# Patient Record
Sex: FEMALE | Race: BLACK OR AFRICAN AMERICAN | NOT HISPANIC OR LATINO
[De-identification: names, ages, dates, MRNs, and addresses within clinical notes are randomized per-mention and may not be internally consistent; named-entity substitution may affect disease eponyms.]

---

## 2022-12-20 PROBLEM — Z00.00 ENCOUNTER FOR PREVENTIVE HEALTH EXAMINATION: Status: ACTIVE | Noted: 2022-12-20

## 2022-12-23 ENCOUNTER — LABORATORY RESULT (OUTPATIENT)
Age: 32
End: 2022-12-23

## 2022-12-23 ENCOUNTER — NON-APPOINTMENT (OUTPATIENT)
Age: 32
End: 2022-12-23

## 2022-12-23 ENCOUNTER — APPOINTMENT (OUTPATIENT)
Dept: PULMONOLOGY | Facility: CLINIC | Age: 32
End: 2022-12-23

## 2022-12-23 VITALS
WEIGHT: 112 LBS | SYSTOLIC BLOOD PRESSURE: 122 MMHG | DIASTOLIC BLOOD PRESSURE: 78 MMHG | TEMPERATURE: 96.7 F | HEIGHT: 57 IN | HEART RATE: 77 BPM | OXYGEN SATURATION: 100 % | BODY MASS INDEX: 24.16 KG/M2

## 2022-12-23 DIAGNOSIS — J45.909 UNSPECIFIED ASTHMA, UNCOMPLICATED: ICD-10-CM

## 2022-12-23 LAB — EPWORTH SCORE: 4

## 2022-12-23 PROCEDURE — 99204 OFFICE O/P NEW MOD 45 MIN: CPT

## 2022-12-23 NOTE — PHYSICAL EXAM
[General Appearance - Well Developed] : well developed [General Appearance - Well Nourished] : well nourished [Normal Conjunctiva] : the conjunctiva exhibited no abnormalities [II] : II [Neck Appearance] : the appearance of the neck was normal [Apical Impulse] : the apical impulse was normal [Heart Sounds] : normal S1 and S2 [Exaggerated Use Of Accessory Muscles For Inspiration] : no accessory muscle use [Auscultation Breath Sounds / Voice Sounds] : lungs were clear to auscultation bilaterally [No Focal Deficits] : no focal deficits [Oriented To Time, Place, And Person] : oriented to person, place, and time [Impaired Insight] : insight and judgment were intact

## 2022-12-23 NOTE — HISTORY OF PRESENT ILLNESS
[FreeTextEntry1] : 32 year old female with Hx of asthma and sleep complaints came in for initial evaluation.\par She was referred by her psychiatrist, Dr Schneider. \par \par Has dyspnea wheezing chest tightness 2-3 nights a week. ACT 16.\par ESS 4\par She is restless sleeper for past few years. She snores at night.\par Wakes up frequently during the night.\par She does not remember most of these episodes\par She started Gabapentin in the past few months with mild improvement, especially with the leg movement.\par The movements are not repetitive.\par Has low energy in the daytime but not sleepy.\par has PTSD for many years. She is on Lithium, Bupoprion\par No cataplexy\par \par Asthma is not well controlled for many months.\par Uses Albuterol. She stopped using Flovent 1 year ago.\par No hospitalizations for asthma. Took Prednisone once.\par Has spring allergies\par \par SHX: non smoker\par FHX: asthma\par \par \par

## 2022-12-23 NOTE — REVIEW OF SYSTEMS
[Fatigue] : fatigue [Snoring] : snoring [Depression] : depression [Anxious] : anxious [Sleep Disturbances due to LE symptoms] : ~T sleep disturbances due to lower extremity symptoms [Unusual Sleep Behavior] : unusual sleep behavior [Negative] : Musculoskeletal [Difficulty Initiating Sleep] : no difficulty falling asleep [Difficulty Maintaining Sleep] : no difficulty maintaining sleep [Acute Insomnia] : no acute insomnia [Chronic Insomnia] : no chronic  insomnia [Hypersomnolence] : not sleeping much more than usual [Cataplexy] :  no cataplexy [Hypnogogic Hallucinations] : no hypnogogic hallucinations [Hypnopompic Hallucinations] : no hypnopompic hallucinations [Sleep Paralysis] : no sleep paralysis [FreeTextEntry8] : see HPI

## 2022-12-24 LAB
BASOPHILS # BLD AUTO: 0.15 K/UL
BASOPHILS NFR BLD AUTO: 2.7 %
EOSINOPHIL # BLD AUTO: 0.05 K/UL
EOSINOPHIL NFR BLD AUTO: 0.9 %
HCT VFR BLD CALC: 42.1 %
HGB BLD-MCNC: 12.8 G/DL
LYMPHOCYTES # BLD AUTO: 0.64 K/UL
LYMPHOCYTES NFR BLD AUTO: 11.6 %
MAN DIFF?: NORMAL
MCHC RBC-ENTMCNC: 26.3 PG
MCHC RBC-ENTMCNC: 30.4 GM/DL
MCV RBC AUTO: 86.4 FL
MONOCYTES # BLD AUTO: 0.54 K/UL
MONOCYTES NFR BLD AUTO: 9.8 %
NEUTROPHILS # BLD AUTO: 4.11 K/UL
NEUTROPHILS NFR BLD AUTO: 75 %
PLATELET # BLD AUTO: 316 K/UL
RBC # BLD: 4.87 M/UL
RBC # FLD: 12.7 %
WBC # FLD AUTO: 5.48 K/UL

## 2022-12-27 RX ORDER — EPINEPHRINE 0.3 MG/.3ML
0.3 INJECTION INTRAMUSCULAR
Qty: 1 | Refills: 2 | Status: ACTIVE | COMMUNITY
Start: 2022-12-23

## 2022-12-30 ENCOUNTER — APPOINTMENT (OUTPATIENT)
Dept: NEUROLOGY | Facility: CLINIC | Age: 32
End: 2022-12-30
Payer: COMMERCIAL

## 2022-12-30 VITALS
SYSTOLIC BLOOD PRESSURE: 116 MMHG | OXYGEN SATURATION: 98 % | HEART RATE: 72 BPM | WEIGHT: 112 LBS | HEIGHT: 57 IN | BODY MASS INDEX: 24.16 KG/M2 | DIASTOLIC BLOOD PRESSURE: 78 MMHG | TEMPERATURE: 97.8 F

## 2022-12-30 DIAGNOSIS — F43.10 POST-TRAUMATIC STRESS DISORDER, UNSPECIFIED: ICD-10-CM

## 2022-12-30 DIAGNOSIS — R61 GENERALIZED HYPERHIDROSIS: ICD-10-CM

## 2022-12-30 DIAGNOSIS — M62.838 OTHER MUSCLE SPASM: ICD-10-CM

## 2022-12-30 LAB
A ALTERNATA IGE QN: 7.89 KUA/L
A FUMIGATUS IGE QN: 0.16 KUA/L
BERMUDA GRASS IGE QN: 0.49 KUA/L
BOXELDER IGE QN: 2.97 KUA/L
C HERBARUM IGE QN: 0.22 KUA/L
CALIF WALNUT IGE QN: 5.09 KUA/L
CAT DANDER IGE QN: 27.7 KUA/L
CMN PIGWEED IGE QN: 0.36 KUA/L
COMMON RAGWEED IGE QN: 9.42 KUA/L
COTTONWOOD IGE QN: 1.6 KUA/L
D FARINAE IGE QN: 55.4 KUA/L
D PTERONYSS IGE QN: 44.2 KUA/L
DEPRECATED A ALTERNATA IGE RAST QL: 3
DEPRECATED A FUMIGATUS IGE RAST QL: NORMAL
DEPRECATED BERMUDA GRASS IGE RAST QL: 1
DEPRECATED BOXELDER IGE RAST QL: 2
DEPRECATED C HERBARUM IGE RAST QL: NORMAL
DEPRECATED CAT DANDER IGE RAST QL: 4
DEPRECATED COMMON PIGWEED IGE RAST QL: 1
DEPRECATED COMMON RAGWEED IGE RAST QL: 3
DEPRECATED COTTONWOOD IGE RAST QL: 2
DEPRECATED D FARINAE IGE RAST QL: 5
DEPRECATED D PTERONYSS IGE RAST QL: 4
DEPRECATED DOG DANDER IGE RAST QL: 2
DEPRECATED GOOSEFOOT IGE RAST QL: 2
DEPRECATED LONDON PLANE IGE RAST QL: 2
DEPRECATED MOUSE URINE PROT IGE RAST QL: 0
DEPRECATED MUGWORT IGE RAST QL: 2
DEPRECATED P NOTATUM IGE RAST QL: 0
DEPRECATED RED CEDAR IGE RAST QL: 4
DEPRECATED ROACH IGE RAST QL: 0
DEPRECATED SHEEP SORREL IGE RAST QL: NORMAL
DEPRECATED SILVER BIRCH IGE RAST QL: 3
DEPRECATED TIMOTHY IGE RAST QL: 3
DEPRECATED WHITE ASH IGE RAST QL: 2
DEPRECATED WHITE OAK IGE RAST QL: 4
DOG DANDER IGE QN: 2.85 KUA/L
GOOSEFOOT IGE QN: 1.2 KUA/L
LONDON PLANE IGE QN: 0.91 KUA/L
MOUSE URINE PROT IGE QN: <0.1 KUA/L
MUGWORT IGE QN: 1.98 KUA/L
MULBERRY (T70) CLASS: NORMAL
MULBERRY (T70) CONC: 0.15 KUA/L
P NOTATUM IGE QN: <0.1 KUA/L
RED CEDAR IGE QN: 20.6 KUA/L
ROACH IGE QN: <0.1 KUA/L
SHEEP SORREL IGE QN: 0.16 KUA/L
SILVER BIRCH IGE QN: 13 KUA/L
TIMOTHY IGE QN: 6.7 KUA/L
TOTAL IGE SMQN RAST: 374 KU/L
TREE ALLERG MIX1 IGE QL: 3
WHITE ASH IGE QN: 2.42 KUA/L
WHITE ELM IGE QN: 2
WHITE ELM IGE QN: 2.86 KUA/L
WHITE OAK IGE QN: 21.5 KUA/L

## 2022-12-30 PROCEDURE — 99204 OFFICE O/P NEW MOD 45 MIN: CPT

## 2022-12-30 RX ORDER — GABAPENTIN 400 MG/1
400 CAPSULE ORAL
Qty: 30 | Refills: 0 | Status: ACTIVE | COMMUNITY
Start: 2022-12-30

## 2022-12-30 RX ORDER — ALBUTEROL SULFATE 90 UG/1
108 (90 BASE) INHALANT RESPIRATORY (INHALATION)
Qty: 1 | Refills: 0 | Status: ACTIVE | COMMUNITY
Start: 2022-12-30

## 2022-12-30 RX ORDER — BUPROPION HYDROCHLORIDE 150 MG/1
150 TABLET, EXTENDED RELEASE ORAL DAILY
Qty: 30 | Refills: 0 | Status: ACTIVE | COMMUNITY
Start: 2022-12-30

## 2022-12-30 RX ORDER — LITHIUM CARBONATE 600 MG/1
600 CAPSULE ORAL
Qty: 30 | Refills: 0 | Status: ACTIVE | COMMUNITY
Start: 2022-12-30

## 2022-12-30 NOTE — PHYSICAL EXAM
[General Appearance - Alert] : alert [General Appearance - In No Acute Distress] : in no acute distress [Oriented To Time, Place, And Person] : oriented to person, place, and time [Impaired Insight] : insight and judgment were intact [Affect] : the affect was normal [Person] : oriented to person [Place] : oriented to place [Time] : oriented to time [Remote Intact] : remote memory intact [Registration Intact] : recent registration memory intact [Span Intact] : the attention span was normal [Concentration Intact] : normal concentrating ability [Visual Intact] : visual attention was ~T not ~L decreased [Naming Objects] : no difficulty naming common objects [Repeating Phrases] : no difficulty repeating a phrase [Writing A Sentence] : no difficulty writing a sentence [Fluency] : fluency intact [Comprehension] : comprehension intact [Reading] : reading intact [Current Events] : adequate knowledge of current events [Past History] : adequate knowledge of personal past history [Cranial Nerves Optic (II)] : visual acuity intact bilaterally,  visual fields full to confrontation, pupils equal round and reactive to light [Cranial Nerves Oculomotor (III)] : extraocular motion intact [Cranial Nerves Trigeminal (V)] : facial sensation intact symmetrically [Cranial Nerves Facial (VII)] : face symmetrical [Cranial Nerves Vestibulocochlear (VIII)] : hearing was intact bilaterally [Cranial Nerves Glossopharyngeal (IX)] : tongue and palate midline [Cranial Nerves Accessory (XI - Cranial And Spinal)] : head turning and shoulder shrug symmetric [Cranial Nerves Hypoglossal (XII)] : there was no tongue deviation with protrusion [Motor Tone] : muscle tone was normal in all four extremities [Motor Strength] : muscle strength was normal in all four extremities [No Muscle Atrophy] : normal bulk in all four extremities [Motor Handedness Right-Handed] : the patient is right hand dominant [Sensation Tactile Decrease] : light touch was intact [Balance] : balance was intact [2+] : Ankle jerk left 2+ [Sclera] : the sclera and conjunctiva were normal [PERRL With Normal Accommodation] : pupils were equal in size, round, reactive to light, with normal accommodation [Extraocular Movements] : extraocular movements were intact [Outer Ear] : the ears and nose were normal in appearance [Oropharynx] : the oropharynx was normal [Neck Appearance] : the appearance of the neck was normal [Neck Cervical Mass (___cm)] : no neck mass was observed [] : no respiratory distress [Edema] : there was no peripheral edema [Abnormal Walk] : normal gait [Short Term Intact] : short term memory impaired [Past-pointing] : there was no past-pointing [Tremor] : no tremor present [Plantar Reflex Right Only] : normal on the right [Plantar Reflex Left Only] : normal on the left [FreeTextEntry4] : Fails to remember 3 words after 5 minutes - recalls apple, nitza - does not recall baseball

## 2022-12-30 NOTE — DISCUSSION/SUMMARY
[FreeTextEntry1] : Patient is a 31yo female with history of PTSD, asthma, and sleep disorder who presents 11 days post injury for post-concussive symptosm.  She developed increase in nausea and vomiting yesterday which seems to have improved today compared to yesterday.  Overall patient's symptoms continuing to improve with exception of yesterday.  Continue symptomatic treatment and supportive care.  She has allergies to NSAIDs and acetaminophen which limits tension type headache treatment.  Given muscle tension would initiate flexeril as needed for spasm and posterior neck tension.  Orthostatics negative for dysautonomia.  \par \par -Cyclobenzaprine 5mg TID PRN\par -Hydration for management of headaches, can trial caffeine and monitor for efficacy\par -Keep symptom log and activity log, progress activity as tolerated\par -Encouraged brain rest as able\par -Work note for 2 weeks - will reassess symptoms at that time\par -Referral to endocrine and OBGYN given episodes of overheating and syncope around menstrual cycle, does not sound cardiac or epileptogenic in origin given symptomatology.\par -If symptoms persist without improvement will consider MRI brain at follow up visit\par -Orthostatic vital signs are negative at this time - will consider tilt-table in future pending evaluation with endocrine\par -Obtain sleep study as previously recommended, RLS on gabapentin, no anemia\par -Follow up in 6 weeks to assess for symptom progression

## 2022-12-30 NOTE — DATA REVIEWED
[de-identified] : CT head: No acute abnormality.   \par CT Cervical spine:  There is no evidence of acute fracture, dislocation, or paravertebral hematoma.  \par There is mild straightening of the usual cervical lordosis and a mild convex right lower cervical \par spine curvature.
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
## 2023-01-05 ENCOUNTER — TRANSCRIPTION ENCOUNTER (OUTPATIENT)
Age: 33
End: 2023-01-05

## 2023-01-10 ENCOUNTER — TRANSCRIPTION ENCOUNTER (OUTPATIENT)
Age: 33
End: 2023-01-10

## 2023-02-09 ENCOUNTER — TRANSCRIPTION ENCOUNTER (OUTPATIENT)
Age: 33
End: 2023-02-09

## 2023-02-17 ENCOUNTER — APPOINTMENT (OUTPATIENT)
Dept: NEUROLOGY | Facility: CLINIC | Age: 33
End: 2023-02-17
Payer: COMMERCIAL

## 2023-02-17 VITALS
DIASTOLIC BLOOD PRESSURE: 70 MMHG | TEMPERATURE: 97.8 F | BODY MASS INDEX: 23.73 KG/M2 | SYSTOLIC BLOOD PRESSURE: 118 MMHG | WEIGHT: 110 LBS | OXYGEN SATURATION: 99 % | HEART RATE: 74 BPM | HEIGHT: 57 IN

## 2023-02-17 PROCEDURE — 99214 OFFICE O/P EST MOD 30 MIN: CPT

## 2023-02-17 NOTE — ASSESSMENT
[FreeTextEntry1] : Patient is a 33yo female with history of PTSD, asthma, and sleep disorder who presents post injury for post-concussive symptoms.  Overall condition continues to improve.  She has allergies to NSAIDs and acetaminophen which limits tension type headache treatment.  Given muscle tension would initiate flexeril as needed for spasm and posterior neck tension.  Orthostatics negative for dysautonomia but would recommend further tilt table testing.  Does not appear to have symptoms consistent suggesting seizure.    \par \par -Cyclobenzaprine 5mg TID PRN refilled\par -PT referral provided for [facility] for Concussion therapy\par -Ordered tilt table testing, will also refer to cardiology to assess for study\par -Hydration for management of headaches, can trial caffeine and monitor for efficacy\par -Use melatonin at nighttime for sleep, start 1mg before bed may titrate as needed\par -Referral to endocrine and OBGYN given episodes of overheating and syncope around menstrual cycle, does not sound cardiac or epileptogenic in origin given symptomatology (called and scheduled appointment for 5/9/23 and on cancellation list)\par -Follow up in 2 months to assess for symptom evolution, and consider if patient appropriate for tilt table eval/card follow up
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
## 2023-02-17 NOTE — DATA REVIEWED
[de-identified] : CT head: No acute abnormality.   \par CT Cervical spine:  There is no evidence of acute fracture, dislocation, or paravertebral hematoma.  \par There is mild straightening of the usual cervical lordosis and a mild convex right lower cervical \par spine curvature.

## 2023-02-17 NOTE — PHYSICAL EXAM
[General Appearance - Alert] : alert [General Appearance - In No Acute Distress] : in no acute distress [Oriented To Time, Place, And Person] : oriented to person, place, and time [Impaired Insight] : insight and judgment were intact [Affect] : the affect was normal [Person] : oriented to person [Place] : oriented to place [Time] : oriented to time [Remote Intact] : remote memory intact [Registration Intact] : recent registration memory intact [Span Intact] : the attention span was normal [Concentration Intact] : normal concentrating ability [Visual Intact] : visual attention was ~T not ~L decreased [Naming Objects] : no difficulty naming common objects [Repeating Phrases] : no difficulty repeating a phrase [Writing A Sentence] : no difficulty writing a sentence [Fluency] : fluency intact [Comprehension] : comprehension intact [Reading] : reading intact [Current Events] : adequate knowledge of current events [Past History] : adequate knowledge of personal past history [Cranial Nerves Optic (II)] : visual acuity intact bilaterally,  visual fields full to confrontation, pupils equal round and reactive to light [Cranial Nerves Oculomotor (III)] : extraocular motion intact [Cranial Nerves Trigeminal (V)] : facial sensation intact symmetrically [Cranial Nerves Facial (VII)] : face symmetrical [Cranial Nerves Vestibulocochlear (VIII)] : hearing was intact bilaterally [Cranial Nerves Glossopharyngeal (IX)] : tongue and palate midline [Cranial Nerves Accessory (XI - Cranial And Spinal)] : head turning and shoulder shrug symmetric [Cranial Nerves Hypoglossal (XII)] : there was no tongue deviation with protrusion [Motor Tone] : muscle tone was normal in all four extremities [Motor Strength] : muscle strength was normal in all four extremities [No Muscle Atrophy] : normal bulk in all four extremities [Motor Handedness Right-Handed] : the patient is right hand dominant [Sensation Tactile Decrease] : light touch was intact [Balance] : balance was intact [2+] : Ankle jerk left 2+ [Sclera] : the sclera and conjunctiva were normal [PERRL With Normal Accommodation] : pupils were equal in size, round, reactive to light, with normal accommodation [Extraocular Movements] : extraocular movements were intact [Outer Ear] : the ears and nose were normal in appearance [Oropharynx] : the oropharynx was normal [Neck Appearance] : the appearance of the neck was normal [Neck Cervical Mass (___cm)] : no neck mass was observed [] : no respiratory distress [Edema] : there was no peripheral edema [Abnormal Walk] : normal gait [Exaggerated Use Of Accessory Muscles For Inspiration] : no accessory muscle use [Short Term Intact] : short term memory intact [Motor Strength Upper Extremities Bilaterally] : strength was normal in both upper extremities [Motor Strength Lower Extremities Bilaterally] : strength was normal in both lower extremities [Past-pointing] : there was no past-pointing [Tremor] : no tremor present [Plantar Reflex Right Only] : normal on the right [Plantar Reflex Left Only] : normal on the left

## 2023-02-17 NOTE — REVIEW OF SYSTEMS
[Sleep Disturbances] : sleep disturbances [As Noted in HPI] : as noted in HPI [Negative] : Integumentary

## 2023-02-21 PROBLEM — Z00.00 ENCOUNTER FOR PREVENTIVE HEALTH EXAMINATION: Noted: 2023-02-21

## 2023-03-16 ENCOUNTER — TRANSCRIPTION ENCOUNTER (OUTPATIENT)
Age: 33
End: 2023-03-16

## 2023-04-12 ENCOUNTER — APPOINTMENT (OUTPATIENT)
Dept: PULMONOLOGY | Facility: CLINIC | Age: 33
End: 2023-04-12
Payer: COMMERCIAL

## 2023-04-12 VITALS
RESPIRATION RATE: 16 BRPM | HEART RATE: 70 BPM | OXYGEN SATURATION: 98 % | BODY MASS INDEX: 23.73 KG/M2 | HEIGHT: 57 IN | DIASTOLIC BLOOD PRESSURE: 70 MMHG | SYSTOLIC BLOOD PRESSURE: 110 MMHG | WEIGHT: 110 LBS

## 2023-04-12 DIAGNOSIS — G47.11 IDIOPATHIC HYPERSOMNIA WITH LONG SLEEP TIME: ICD-10-CM

## 2023-04-12 DIAGNOSIS — G47.00 INSOMNIA, UNSPECIFIED: ICD-10-CM

## 2023-04-12 DIAGNOSIS — G47.69 OTHER SLEEP RELATED MOVEMENT DISORDERS: ICD-10-CM

## 2023-04-12 DIAGNOSIS — Z78.9 OTHER SPECIFIED HEALTH STATUS: ICD-10-CM

## 2023-04-12 PROCEDURE — 99203 OFFICE O/P NEW LOW 30 MIN: CPT

## 2023-04-12 PROCEDURE — 99213 OFFICE O/P EST LOW 20 MIN: CPT

## 2023-04-12 RX ORDER — CYCLOBENZAPRINE HYDROCHLORIDE 5 MG/1
5 TABLET, FILM COATED ORAL 3 TIMES DAILY
Qty: 20 | Refills: 0 | Status: DISCONTINUED | COMMUNITY
Start: 2022-12-30 | End: 2023-04-12

## 2023-04-12 NOTE — PHYSICAL EXAM
[General Appearance - Well Developed] : well developed [General Appearance - Well Nourished] : well nourished [Elongated Uvula] : elongated uvula [II] : II [Heart Sounds] : normal S1 and S2 [Murmurs] : no murmurs [] : no respiratory distress [Auscultation Breath Sounds / Voice Sounds] : lungs were clear to auscultation bilaterally [No Focal Deficits] : no focal deficits [Oriented To Time, Place, And Person] : oriented to person, place, and time [Memory Recent] : recent memory was not impaired [FreeTextEntry1] : narrow airway

## 2023-04-12 NOTE — REASON FOR VISIT
[Initial Evaluation] : an initial evaluation [FreeTextEntry2] : excessive daytime sleepiness, periodic limb movements

## 2023-04-28 ENCOUNTER — APPOINTMENT (OUTPATIENT)
Dept: NEUROLOGY | Facility: CLINIC | Age: 33
End: 2023-04-28
Payer: COMMERCIAL

## 2023-04-28 VITALS
DIASTOLIC BLOOD PRESSURE: 66 MMHG | TEMPERATURE: 97.8 F | HEART RATE: 79 BPM | SYSTOLIC BLOOD PRESSURE: 110 MMHG | WEIGHT: 110 LBS | OXYGEN SATURATION: 99 % | HEIGHT: 57 IN | BODY MASS INDEX: 23.73 KG/M2

## 2023-04-28 DIAGNOSIS — F07.81 POSTCONCUSSIONAL SYNDROME: ICD-10-CM

## 2023-04-28 DIAGNOSIS — R55 SYNCOPE AND COLLAPSE: ICD-10-CM

## 2023-04-28 PROCEDURE — 99213 OFFICE O/P EST LOW 20 MIN: CPT
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
## 2023-04-28 NOTE — DATA REVIEWED
[de-identified] : CT head: No acute abnormality.   \par CT Cervical spine:  There is no evidence of acute fracture, dislocation, or paravertebral hematoma.  \par There is mild straightening of the usual cervical lordosis and a mild convex right lower cervical \par spine curvature.

## 2023-04-28 NOTE — PHYSICAL EXAM
[FreeTextEntry1] : Constitutional: Well nourished, well developed adult in no distress\par Psych: Mood broad, affect not restricted\par Head: NCAT\par Eyes: Anicteric\par Ears: Normal appearing pinna\par Neck: Normal appearing and not enlarged\par Lungs: No accessory muscle use, no respiratory distress\par Cardiac/Vascular: No edema\par Abdomen: Non-distended\par Skin: No visible lesions or rashes. Skin is warm and dry\par \par Neurological Examination\par Mental status: Awake, alert, appropriate.  Gives detailed history. oriented to person, place, and time.\par \par Cranial nerves  \par II: Visual acuity grossly intact, visual fields full\par III, IV, VI: PERRLA, EOMI, no nystagmus \par V: Facial sensation is normal B/L.    \par VII: Facial strength is normal B/L.  \par VIII:   Hearing grossly intact bilaterally to spoken voice although not formally assessed\par IX, X: Palate is midline and elevates symmetrically.    \par XI: Trapezius normal strength.    \par XII: Tongue midline without atrophy or fasciculations.    \par \par Speech: No aphasia, dysarthria.  Normal intonation and prosody of speech\par Motor: Normal muscle bulk and tone throughout.  \par Muscle Strength: 5/5 muscle strength throughout bilateral upper and lower extremities with assessed shoulder abduction, elbow flexion/extension, hand , hip flexion, knee flexion/extension, ankle plantarflexion/dorsiflexion\par Reflexes: 2+ reflexes throughout.\par Coordination No ataxia - FTN within normal limits. No tremor or dysmetria\par Sensory: Sensation to light touch intact bilateral upper and lower extremities

## 2023-04-28 NOTE — ASSESSMENT
[FreeTextEntry1] : Patient is a 31yo female with history of PTSD, asthma, and sleep disorder who presents post injury for post-concussive symptoms.  Overall condition continues to improve and course is consistent with mostly recovered post-concussive syndrome.  She has syncopal episodes but event description is not consistent with seizure, I would not recommend obtaining EEG at this time as there is not description consistent with aura, ictal or post-ictal state.  I encouraged her to have evaluation for possible POTS and consider further investigation as syncopal episodes appear non-neurological in nature.  \par \par -Cyclobenzaprine 5mg TID PRN - may call for refill\par -PT referral provided for [facility] for Concussion therapy - encouraged this \par -Referral to cardiology for tilt table testing\par -Continue low dose melatonin for insomnia\par -Follow up PRN

## 2023-05-09 ENCOUNTER — APPOINTMENT (OUTPATIENT)
Dept: ENDOCRINOLOGY | Facility: CLINIC | Age: 33
End: 2023-05-09

## 2023-07-20 ENCOUNTER — TRANSCRIPTION ENCOUNTER (OUTPATIENT)
Age: 33
End: 2023-07-20

## 2023-10-30 ENCOUNTER — RX RENEWAL (OUTPATIENT)
Age: 33
End: 2023-10-30

## 2023-10-30 RX ORDER — FLUTICASONE PROPIONATE 110 UG/1
110 AEROSOL, METERED RESPIRATORY (INHALATION) TWICE DAILY
Qty: 12 | Refills: 5 | Status: ACTIVE | COMMUNITY
Start: 2022-12-23 | End: 1900-01-01